# Patient Record
Sex: FEMALE | Race: WHITE | URBAN - METROPOLITAN AREA
[De-identification: names, ages, dates, MRNs, and addresses within clinical notes are randomized per-mention and may not be internally consistent; named-entity substitution may affect disease eponyms.]

---

## 2018-01-11 PROBLEM — M46.1 SACROILIITIS (HCC): Chronic | Status: ACTIVE | Noted: 2018-01-11

## 2018-01-11 PROBLEM — M96.1 LUMBAR POST-LAMINECTOMY SYNDROME: Chronic | Status: ACTIVE | Noted: 2018-01-11

## 2018-01-11 PROBLEM — G03.9 ARACHNOIDITIS: Chronic | Status: ACTIVE | Noted: 2018-01-11

## 2018-01-11 PROBLEM — M47.816 OSTEOARTHRITIS OF LUMBAR SPINE: Chronic | Status: ACTIVE | Noted: 2018-01-11

## 2018-08-31 ENCOUNTER — HOSPITAL ENCOUNTER (OUTPATIENT)
Age: 48
Discharge: HOME | End: 2018-09-02
Payer: COMMERCIAL

## 2018-08-31 LAB
ALBUMIN SERPL-MCNC: 4.4 G/DL (ref 3.5–5.2)
ALP BLD-CCNC: 63 U/L (ref 35–104)
ALT SERPL-CCNC: 15 U/L (ref 0–32)
AST SERPL-CCNC: 26 U/L (ref 0–31)
BILIRUB SERPL-MCNC: 0.5 MG/DL (ref 0–1.2)
BILIRUBIN DIRECT: <0.2 MG/DL (ref 0–0.3)
BILIRUBIN, INDIRECT: NORMAL MG/DL (ref 0–1)
BUN BLDV-MCNC: 10 MG/DL (ref 6–20)
CREAT SERPL-MCNC: 0.8 MG/DL (ref 0.5–1)
GFR AFRICAN AMERICAN: >60
GFR NON-AFRICAN AMERICAN: >60 ML/MIN/1.73
TOTAL PROTEIN: 7.9 G/DL (ref 6.4–8.3)

## 2018-08-31 PROCEDURE — 36415 COLL VENOUS BLD VENIPUNCTURE: CPT

## 2018-08-31 PROCEDURE — 82565 ASSAY OF CREATININE: CPT

## 2018-08-31 PROCEDURE — 80076 HEPATIC FUNCTION PANEL: CPT

## 2018-08-31 PROCEDURE — 84520 ASSAY OF UREA NITROGEN: CPT

## 2023-05-30 PROBLEM — M54.9 BACK PAIN: Status: ACTIVE | Noted: 2023-05-30

## 2023-05-30 PROBLEM — E55.9 VITAMIN D DEFICIENCY: Status: ACTIVE | Noted: 2023-05-30

## 2023-05-30 PROBLEM — R43.9 DECREASED TASTE AND SMELL: Status: ACTIVE | Noted: 2023-05-30

## 2023-05-30 PROBLEM — R63.1 EXCESSIVE THIRST: Status: ACTIVE | Noted: 2023-05-30

## 2023-05-30 PROBLEM — E83.39 ACID PHOSPHATASE DEFICIENCY: Status: ACTIVE | Noted: 2023-05-30

## 2023-05-30 PROBLEM — E83.31: Status: ACTIVE | Noted: 2023-05-30

## 2023-05-30 PROBLEM — R51.9 PRESSURE IN HEAD: Status: ACTIVE | Noted: 2023-05-30

## 2023-05-30 PROBLEM — G44.009 HEADACHES, CLUSTER: Status: ACTIVE | Noted: 2023-05-30

## 2023-05-30 PROBLEM — M25.559 HIP PAIN: Status: ACTIVE | Noted: 2023-05-30

## 2023-05-30 PROBLEM — M79.2 NERVE PAIN: Status: ACTIVE | Noted: 2023-05-30

## 2023-05-30 PROBLEM — E83.52 ACQUIRED HYPOCALCIURIC HYPERCALCEMIA: Status: ACTIVE | Noted: 2023-05-30

## 2023-05-30 PROBLEM — R63.5 WEIGHT GAIN: Status: ACTIVE | Noted: 2023-05-30

## 2023-05-30 PROBLEM — R43.2 LOSS OF TASTE: Status: ACTIVE | Noted: 2023-05-30

## 2023-05-30 PROBLEM — R60.9 FLUID RETENTION: Status: ACTIVE | Noted: 2023-05-30

## 2023-05-30 PROBLEM — R92.8 ABNORMAL MAMMOGRAM: Status: ACTIVE | Noted: 2023-05-30

## 2023-05-30 PROBLEM — R26.89 BALANCE PROBLEMS: Status: ACTIVE | Noted: 2023-05-30

## 2023-05-30 PROBLEM — R35.0 FREQUENCY OF URINATION: Status: ACTIVE | Noted: 2023-05-30

## 2023-05-30 PROBLEM — M90.80: Status: ACTIVE | Noted: 2023-05-30

## 2023-05-30 PROBLEM — N95.0 POST-MENOPAUSAL BLEEDING: Status: ACTIVE | Noted: 2023-05-30

## 2023-05-30 PROBLEM — E78.9 ABNORMAL CHOLESTEROL TEST: Status: ACTIVE | Noted: 2023-05-30

## 2023-06-01 ENCOUNTER — APPOINTMENT (OUTPATIENT)
Dept: PRIMARY CARE | Facility: CLINIC | Age: 53
End: 2023-06-01
Payer: MEDICARE

## 2023-06-07 ENCOUNTER — APPOINTMENT (OUTPATIENT)
Dept: PRIMARY CARE | Facility: CLINIC | Age: 53
End: 2023-06-07
Payer: MEDICARE

## 2024-05-03 ENCOUNTER — CLINICAL SUPPORT (OUTPATIENT)
Dept: PRIMARY CARE | Facility: CLINIC | Age: 54
End: 2024-05-03
Payer: MEDICARE

## 2024-05-03 DIAGNOSIS — R60.9 FLUID RETENTION: ICD-10-CM

## 2024-05-03 DIAGNOSIS — E55.9 VITAMIN D DEFICIENCY: ICD-10-CM

## 2024-05-03 DIAGNOSIS — E78.9 ABNORMAL CHOLESTEROL TEST: ICD-10-CM

## 2024-05-03 LAB
ALBUMIN SERPL BCP-MCNC: 4.2 G/DL (ref 3.4–5)
ALP SERPL-CCNC: 68 U/L (ref 33–110)
ALT SERPL W P-5'-P-CCNC: 15 U/L (ref 7–45)
ANION GAP SERPL CALC-SCNC: 13 MMOL/L (ref 10–20)
AST SERPL W P-5'-P-CCNC: 23 U/L (ref 9–39)
BILIRUB SERPL-MCNC: 0.6 MG/DL (ref 0–1.2)
BUN SERPL-MCNC: 13 MG/DL (ref 6–23)
CALCIUM SERPL-MCNC: 9.4 MG/DL (ref 8.6–10.3)
CHLORIDE SERPL-SCNC: 101 MMOL/L (ref 98–107)
CHOLEST SERPL-MCNC: 200 MG/DL (ref 0–199)
CHOLESTEROL/HDL RATIO: 4.2
CO2 SERPL-SCNC: 29 MMOL/L (ref 21–32)
CREAT SERPL-MCNC: 0.89 MG/DL (ref 0.5–1.05)
EGFRCR SERPLBLD CKD-EPI 2021: 78 ML/MIN/1.73M*2
GLUCOSE SERPL-MCNC: 66 MG/DL (ref 74–99)
HDLC SERPL-MCNC: 47.8 MG/DL
LDLC SERPL CALC-MCNC: 124 MG/DL
NON HDL CHOLESTEROL: 152 MG/DL (ref 0–149)
POTASSIUM SERPL-SCNC: 4.2 MMOL/L (ref 3.5–5.3)
PROT SERPL-MCNC: 7.2 G/DL (ref 6.4–8.2)
SODIUM SERPL-SCNC: 139 MMOL/L (ref 136–145)
TRIGL SERPL-MCNC: 141 MG/DL (ref 0–149)
VLDL: 28 MG/DL (ref 0–40)

## 2024-05-03 PROCEDURE — 36415 COLL VENOUS BLD VENIPUNCTURE: CPT

## 2024-05-03 PROCEDURE — 82306 VITAMIN D 25 HYDROXY: CPT

## 2024-05-03 PROCEDURE — 80061 LIPID PANEL: CPT

## 2024-05-03 PROCEDURE — 80053 COMPREHEN METABOLIC PANEL: CPT

## 2024-05-03 NOTE — PROGRESS NOTES
Sunni came into clinic today and had her labs drawn. Verified name and . ABN signed. Tolerated lab draw and left without incident.     Bessy Oneill MA

## 2024-05-04 LAB — 25(OH)D3 SERPL-MCNC: 77 NG/ML (ref 30–100)

## 2024-05-07 ENCOUNTER — OFFICE VISIT (OUTPATIENT)
Dept: PRIMARY CARE | Facility: CLINIC | Age: 54
End: 2024-05-07
Payer: MEDICARE

## 2024-05-07 VITALS
BODY MASS INDEX: 33.46 KG/M2 | WEIGHT: 196 LBS | HEART RATE: 81 BPM | HEIGHT: 64 IN | TEMPERATURE: 97.7 F | DIASTOLIC BLOOD PRESSURE: 76 MMHG | SYSTOLIC BLOOD PRESSURE: 122 MMHG | OXYGEN SATURATION: 96 %

## 2024-05-07 DIAGNOSIS — M54.42 CHRONIC BILATERAL LOW BACK PAIN WITH LEFT-SIDED SCIATICA: ICD-10-CM

## 2024-05-07 DIAGNOSIS — G89.29 CHRONIC BILATERAL LOW BACK PAIN WITH LEFT-SIDED SCIATICA: ICD-10-CM

## 2024-05-07 DIAGNOSIS — R60.9 FLUID RETENTION: ICD-10-CM

## 2024-05-07 DIAGNOSIS — Z00.00 ROUTINE GENERAL MEDICAL EXAMINATION AT HEALTH CARE FACILITY: Primary | ICD-10-CM

## 2024-05-07 PROCEDURE — G0439 PPPS, SUBSEQ VISIT: HCPCS | Performed by: FAMILY MEDICINE

## 2024-05-07 RX ORDER — FUROSEMIDE 40 MG/1
40 TABLET ORAL DAILY
Qty: 30 TABLET | Refills: 1 | Status: SHIPPED | OUTPATIENT
Start: 2024-05-07

## 2024-05-07 RX ORDER — MAGNESIUM 250 MG
TABLET ORAL
COMMUNITY

## 2024-05-07 RX ORDER — CYCLOBENZAPRINE HCL 10 MG
TABLET ORAL
COMMUNITY

## 2024-05-07 RX ORDER — OXYCODONE HYDROCHLORIDE 5 MG/1
TABLET ORAL
COMMUNITY

## 2024-05-07 ASSESSMENT — VISUAL ACUITY
OS_CC: 20/10
OD_CC: 20/10

## 2024-05-07 ASSESSMENT — ACTIVITIES OF DAILY LIVING (ADL)
TAKING_MEDICATION: INDEPENDENT
MANAGING_FINANCES: INDEPENDENT
DOING_HOUSEWORK: INDEPENDENT
BATHING: INDEPENDENT
DRESSING: INDEPENDENT
GROCERY_SHOPPING: INDEPENDENT

## 2024-05-07 ASSESSMENT — ENCOUNTER SYMPTOMS
LOSS OF SENSATION IN FEET: 1
OCCASIONAL FEELINGS OF UNSTEADINESS: 1
DEPRESSION: 0

## 2024-05-07 ASSESSMENT — PATIENT HEALTH QUESTIONNAIRE - PHQ9
SUM OF ALL RESPONSES TO PHQ9 QUESTIONS 1 AND 2: 0
1. LITTLE INTEREST OR PLEASURE IN DOING THINGS: NOT AT ALL
2. FEELING DOWN, DEPRESSED OR HOPELESS: NOT AT ALL

## 2024-05-07 NOTE — PROGRESS NOTES
"Subjective   Patient ID: Sunni Fisher is a 53 y.o. female who presents for Medicare Annual Wellness Visit Subsequent (Lab review).    HPI WANTS TO REVIEW HER LABS AND HAS BACK PAIN /  B/P AND LABS ARE STABLE     Review of Systems   Constitutional:  Negative for activity change, appetite change, fever and unexpected weight change.   HENT:  Negative for congestion, ear pain, postnasal drip, rhinorrhea, sinus pressure and sore throat.    Eyes:  Negative for discharge, itching and visual disturbance.   Respiratory:  Negative for cough, shortness of breath and wheezing.    Cardiovascular:  Positive for leg swelling. Negative for chest pain and palpitations.   Gastrointestinal:  Negative for abdominal pain, blood in stool, constipation, diarrhea, nausea and vomiting.   Endocrine: Negative for cold intolerance, heat intolerance and polyuria.   Genitourinary:  Negative for dysuria, flank pain and hematuria.   Musculoskeletal:  Positive for back pain. Negative for arthralgias, gait problem, joint swelling and myalgias.   Skin:  Negative for rash.   Allergic/Immunologic: Negative for environmental allergies and food allergies.   Neurological:  Negative for dizziness, syncope, weakness, light-headedness, numbness and headaches.   Psychiatric/Behavioral:  Negative for dysphoric mood and sleep disturbance. The patient is not nervous/anxious.        Objective   /76   Pulse 81   Temp 36.5 °C (97.7 °F)   Ht 1.632 m (5' 4.25\")   Wt 88.9 kg (196 lb)   SpO2 96%   BMI 33.38 kg/m²     Physical Exam  Vitals and nursing note reviewed.   Constitutional:       Appearance: Normal appearance. She is obese.   HENT:      Head: Normocephalic.   Cardiovascular:      Rate and Rhythm: Normal rate and regular rhythm.      Pulses: Normal pulses.      Heart sounds: Normal heart sounds. No murmur heard.     No friction rub. No gallop.   Pulmonary:      Effort: Pulmonary effort is normal. No respiratory distress.      Breath sounds: Normal " breath sounds. No wheezing.   Abdominal:      General: Bowel sounds are normal. There is no distension.      Palpations: Abdomen is soft.      Tenderness: There is no abdominal tenderness.   Musculoskeletal:         General: Tenderness present. No deformity. Normal range of motion.      Right lower leg: Edema present.      Left lower leg: Edema present.      Comments: CHRONIC LOW BACK PAIN    Skin:     General: Skin is warm and dry.      Capillary Refill: Capillary refill takes less than 2 seconds.   Neurological:      General: No focal deficit present.      Mental Status: She is alert and oriented to person, place, and time.   Psychiatric:         Mood and Affect: Mood normal.         Assessment/Plan   Problem List Items Addressed This Visit             ICD-10-CM    Fluid retention R60.9    Relevant Medications    furosemide (Lasix) 40 mg tablet    Back pain M54.9    Relevant Orders    Disability Placard     Other Visit Diagnoses         Codes    Routine general medical examination at health care facility    -  Primary Z00.00

## 2024-05-08 ASSESSMENT — ENCOUNTER SYMPTOMS
WHEEZING: 0
CONSTIPATION: 0
SINUS PRESSURE: 0
APPETITE CHANGE: 0
NERVOUS/ANXIOUS: 0
PALPITATIONS: 0
EYE ITCHING: 0
HEADACHES: 0
SORE THROAT: 0
JOINT SWELLING: 0
FLANK PAIN: 0
SHORTNESS OF BREATH: 0
DYSURIA: 0
UNEXPECTED WEIGHT CHANGE: 0
HEMATURIA: 0
DIZZINESS: 0
VOMITING: 0
LIGHT-HEADEDNESS: 0
FEVER: 0
NAUSEA: 0
RHINORRHEA: 0
NUMBNESS: 0
ARTHRALGIAS: 0
WEAKNESS: 0
BLOOD IN STOOL: 0
MYALGIAS: 0
DYSPHORIC MOOD: 0
COUGH: 0
BACK PAIN: 1
ABDOMINAL PAIN: 0
SLEEP DISTURBANCE: 0
ACTIVITY CHANGE: 0
EYE DISCHARGE: 0
DIARRHEA: 0

## 2024-11-07 ENCOUNTER — APPOINTMENT (OUTPATIENT)
Dept: PRIMARY CARE | Facility: CLINIC | Age: 54
End: 2024-11-07
Payer: MEDICARE

## 2024-11-12 ENCOUNTER — APPOINTMENT (OUTPATIENT)
Dept: PRIMARY CARE | Facility: CLINIC | Age: 54
End: 2024-11-12
Payer: MEDICARE

## 2025-04-08 ENCOUNTER — TELEPHONE (OUTPATIENT)
Dept: PRIMARY CARE | Facility: CLINIC | Age: 55
End: 2025-04-08

## 2025-04-08 NOTE — TELEPHONE ENCOUNTER
----- Message from Geeta Abbasi sent at 1/21/2025 10:25 AM EST -----  Regarding: BHAVYA  DUE 05/07/25

## 2025-06-16 ENCOUNTER — TELEPHONE (OUTPATIENT)
Dept: PRIMARY CARE | Facility: CLINIC | Age: 55
End: 2025-06-16
Payer: MEDICARE

## 2025-08-22 ENCOUNTER — TELEPHONE (OUTPATIENT)
Dept: PRIMARY CARE | Facility: CLINIC | Age: 55
End: 2025-08-22
Payer: MEDICARE